# Patient Record
Sex: FEMALE | Race: WHITE | NOT HISPANIC OR LATINO | Employment: UNEMPLOYED | URBAN - METROPOLITAN AREA
[De-identification: names, ages, dates, MRNs, and addresses within clinical notes are randomized per-mention and may not be internally consistent; named-entity substitution may affect disease eponyms.]

---

## 2019-02-02 ENCOUNTER — HOSPITAL ENCOUNTER (EMERGENCY)
Facility: HOSPITAL | Age: 44
Discharge: HOME | End: 2019-02-02

## 2019-02-02 ENCOUNTER — HOSPITAL ENCOUNTER (EMERGENCY)
Facility: HOSPITAL | Age: 44
Discharge: LEFT BEFORE DISCHARGE | End: 2019-02-02
Attending: EMERGENCY MEDICINE | Admitting: EMERGENCY MEDICINE

## 2019-02-02 VITALS
WEIGHT: 140 LBS | TEMPERATURE: 98 F | BODY MASS INDEX: 21.97 KG/M2 | HEIGHT: 67 IN | DIASTOLIC BLOOD PRESSURE: 79 MMHG | SYSTOLIC BLOOD PRESSURE: 129 MMHG | RESPIRATION RATE: 24 BRPM | HEART RATE: 107 BPM

## 2019-02-02 DIAGNOSIS — Z53.29 LEFT AGAINST MEDICAL ADVICE: Primary | ICD-10-CM

## 2019-02-02 PROCEDURE — 99281 EMR DPT VST MAYX REQ PHY/QHP: CPT

## 2019-02-02 RX ORDER — ONDANSETRON 2 MG/ML
4 INJECTION INTRAMUSCULAR; INTRAVENOUS ONCE
Status: DISCONTINUED | OUTPATIENT
Start: 2019-02-02 | End: 2019-02-02 | Stop reason: HOSPADM

## 2019-02-02 RX ORDER — SODIUM CHLORIDE 0.9 % (FLUSH) 0.9 %
10 SYRINGE (ML) INJECTION AS NEEDED
Status: DISCONTINUED | OUTPATIENT
Start: 2019-02-02 | End: 2019-02-02 | Stop reason: HOSPADM

## 2019-02-02 RX ORDER — KETOROLAC TROMETHAMINE 15 MG/ML
10 INJECTION, SOLUTION INTRAMUSCULAR; INTRAVENOUS ONCE
Status: DISCONTINUED | OUTPATIENT
Start: 2019-02-02 | End: 2019-02-02 | Stop reason: HOSPADM

## 2019-02-03 NOTE — ED PROVIDER NOTES
"Subjective   Angeles Nichols is a 43 y.o.female who presents to the emergency department with complaints of abdominal pain. The patient states that the pain onset 3 days and began as generalized cramping but has worsened in the RUQ. She notes that she has been belching more and it felt like \"gas pain\" at first. She denies nausea and vomiting. She reports that she has some pain radiating to the right flank but it has since resolved. She denies dysuria or hematuria. She has had no cough recently.  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 She denies any abdominal surgeries. There are no other acute complaints at this time.          History provided by:  Patient  Abdominal Pain   Pain location:  RUQ  Pain quality: cramping    Pain radiates to:  R flank (resolved)  Pain severity:  Moderate  Onset quality:  Sudden  Duration:  3 days  Timing:  Constant  Progression:  Worsening  Chronicity:  New  Relieved by:  None tried  Ineffective treatments:  None tried  Associated symptoms: belching and diarrhea    Associated symptoms: no cough, no dysuria, no hematuria, no nausea and no vomiting        Review of Systems   Respiratory: Negative for cough.    Gastrointestinal: Positive for abdominal pain and diarrhea. Negative for nausea and vomiting.   Genitourinary: Positive for flank pain (resolved). Negative for dysuria and hematuria.   All other systems reviewed and are negative.      No past medical history on file.    No Known Allergies    No past surgical history on file.    No family history on file.    Social History     Socioeconomic History   • Marital status: Single     Spouse name: Not on file   • Number of children: Not on file   • Years of education: Not on file   • Highest education level: Not on file         Objective   Physical Exam   Constitutional: She is oriented to person, place, and time. She appears well-developed and well-nourished. No " distress.   Patient is alert, non-toxic and afebrile.    HENT:   Head: Normocephalic and atraumatic.   Nose: Nose normal.   Eyes: Conjunctivae are normal. No scleral icterus.   Neck: Normal range of motion. Neck supple.   Cardiovascular: Normal rate, regular rhythm and normal heart sounds.   Pulmonary/Chest: Effort normal and breath sounds normal. No respiratory distress.   Abdominal: Soft. Bowel sounds are normal. There is no hepatosplenomegaly. There is tenderness in the right upper quadrant. There is guarding and CVA tenderness. There is no tenderness at McBurney's point.   Patient has tenderness in the RUQ with mild guarding. McBurney's point is not tender to palpation. Mild right CVA tenderness.   Musculoskeletal: Normal range of motion.   Neurological: She is alert and oriented to person, place, and time.   Skin: Skin is warm and dry.   Psychiatric: She has a normal mood and affect. Her behavior is normal.   Nursing note and vitals reviewed.      Procedures         ED Course  ED Course as of Feb 02 2051   Sat Feb 02, 2019 2050 Notified by patient's RN at 2034 hrs., patient left AGAINST MEDICAL ADVICE.  I did not have the opportunity to discuss this with the patient, as she signed out before I was notified.  [TG]      ED Course User Index  [TG] Tree Collins PA-C     No results found for this or any previous visit (from the past 24 hour(s)).  Note: In addition to lab results from this visit, the labs listed above may include labs taken at another facility or during a different encounter within the last 24 hours. Please correlate lab times with ED admission and discharge times for further clarification of the services performed during this visit.    CT Abdomen Pelvis With Contrast    (Results Pending)     Vitals:    02/02/19 1856   BP: 129/79   BP Location: Left arm   Patient Position: Sitting   Pulse: 107   Resp: 24   Temp: 98 °F (36.7 °C)   TempSrc: Oral   Weight: 63.5 kg (140 lb)   Height: 170.2 cm  "(67\")     Medications   sodium chloride 0.9 % flush 10 mL (not administered)   sodium chloride 0.9 % bolus 1,000 mL (not administered)   ketorolac (TORADOL) injection 10 mg (not administered)   ondansetron (ZOFRAN) injection 4 mg (not administered)     ECG/EMG Results (last 24 hours)     ** No results found for the last 24 hours. **        No orders to display                       MDM    Final diagnoses:   Left against medical advice       Documentation assistance provided by jay Peterson.  Information recorded by the jay was done at my direction and has been verified and validated by me.     Jose Peterson  02/02/19 1927       Tree Collins PA-C  02/02/19 2051    "